# Patient Record
Sex: FEMALE | Race: AMERICAN INDIAN OR ALASKA NATIVE
[De-identification: names, ages, dates, MRNs, and addresses within clinical notes are randomized per-mention and may not be internally consistent; named-entity substitution may affect disease eponyms.]

---

## 2021-01-12 ENCOUNTER — HOSPITAL ENCOUNTER (EMERGENCY)
Dept: HOSPITAL 94 - ER | Age: 51
Discharge: HOME | End: 2021-01-12
Payer: COMMERCIAL

## 2021-01-12 VITALS — HEIGHT: 65 IN | BODY MASS INDEX: 19.03 KG/M2 | WEIGHT: 114.24 LBS

## 2021-01-12 VITALS — SYSTOLIC BLOOD PRESSURE: 152 MMHG | DIASTOLIC BLOOD PRESSURE: 81 MMHG

## 2021-01-12 DIAGNOSIS — R11.2: ICD-10-CM

## 2021-01-12 DIAGNOSIS — Z79.899: ICD-10-CM

## 2021-01-12 DIAGNOSIS — Z88.0: ICD-10-CM

## 2021-01-12 DIAGNOSIS — R10.13: ICD-10-CM

## 2021-01-12 DIAGNOSIS — Z90.49: ICD-10-CM

## 2021-01-12 DIAGNOSIS — F17.200: ICD-10-CM

## 2021-01-12 DIAGNOSIS — N39.0: Primary | ICD-10-CM

## 2021-01-12 LAB
ALBUMIN SERPL BCP-MCNC: 3.9 G/DL (ref 3.4–5)
ALBUMIN/GLOB SERPL: 0.9 {RATIO} (ref 1.1–1.5)
ALP SERPL-CCNC: 99 IU/L (ref 46–116)
ALT SERPL W P-5'-P-CCNC: 34 U/L (ref 12–78)
ANION GAP SERPL CALCULATED.3IONS-SCNC: 14 MMOL/L (ref 8–16)
AST SERPL W P-5'-P-CCNC: 26 U/L (ref 10–37)
BACTERIA URNS QL MICRO: (no result) /HPF
BASOPHILS # BLD AUTO: 0.1 X10'3 (ref 0–0.2)
BASOPHILS NFR BLD AUTO: 0.7 % (ref 0–1)
BILIRUB SERPL-MCNC: 0.8 MG/DL (ref 0.1–1)
BUN SERPL-MCNC: 14 MG/DL (ref 7–18)
BUN/CREAT SERPL: 17.9 (ref 6.6–38)
CALCIUM SERPL-MCNC: 9.4 MG/DL (ref 8.5–10.1)
CHLORIDE SERPL-SCNC: 108 MMOL/L (ref 99–107)
CLARITY UR: CLEAR
CO2 SERPL-SCNC: 26.4 MMOL/L (ref 24–32)
COLOR UR: YELLOW
CREAT SERPL-MCNC: 0.78 MG/DL (ref 0.4–0.9)
DEPRECATED SQUAMOUS URNS QL MICRO: (no result) /LPF
EOSINOPHIL # BLD AUTO: 0.1 X10'3 (ref 0–0.9)
EOSINOPHIL NFR BLD AUTO: 0.8 % (ref 0–6)
ERYTHROCYTE [DISTWIDTH] IN BLOOD BY AUTOMATED COUNT: 15.1 % (ref 11.5–14.5)
GFR SERPL CREATININE-BSD FRML MDRD: 79 ML/MIN
GLUCOSE SERPL-MCNC: 77 MG/DL (ref 70–104)
GLUCOSE UR STRIP-MCNC: NEGATIVE MG/DL
HCG UR QL: NEGATIVE
HCT VFR BLD AUTO: 36.9 % (ref 35–45)
HGB BLD-MCNC: 11.7 G/DL (ref 12–16)
HGB UR QL STRIP: NEGATIVE
KETONES UR STRIP-MCNC: 15 MG/DL
LEUKOCYTE ESTERASE UR QL STRIP: (no result)
LIPASE SERPL-CCNC: 107 U/L (ref 73–393)
LYMPHOCYTES # BLD AUTO: 1.5 X10'3 (ref 1.1–4.8)
LYMPHOCYTES NFR BLD AUTO: 19.1 % (ref 21–51)
MCH RBC QN AUTO: 25.4 PG (ref 27–31)
MCHC RBC AUTO-ENTMCNC: 31.7 G/DL (ref 33–36.5)
MCV RBC AUTO: 79.9 FL (ref 78–98)
MONOCYTES # BLD AUTO: 0.7 X10'3 (ref 0–0.9)
MONOCYTES NFR BLD AUTO: 8.3 % (ref 2–12)
NEUTROPHILS # BLD AUTO: 5.7 X10'3 (ref 1.8–7.7)
NEUTROPHILS NFR BLD AUTO: 71.1 % (ref 42–75)
NITRITE UR QL STRIP: POSITIVE
PH UR STRIP: 5 [PH] (ref 4.8–8)
PLATELET # BLD AUTO: 215 X10'3 (ref 140–440)
PMV BLD AUTO: 8.9 FL (ref 7.4–10.4)
POTASSIUM SERPL-SCNC: 3.7 MMOL/L (ref 3.5–5.1)
PROT SERPL-MCNC: 8.4 G/DL (ref 6.4–8.2)
PROT UR QL STRIP: NEGATIVE MG/DL
RBC # BLD AUTO: 4.62 X10'6 (ref 4.2–5.6)
RBC #/AREA URNS HPF: (no result) /HPF (ref 0–2)
SODIUM SERPL-SCNC: 148 MMOL/L (ref 135–145)
SP GR UR STRIP: 1.02 (ref 1–1.03)
URN COLLECT METHOD CLASS: (no result)
UROBILINOGEN UR STRIP-MCNC: 0.2 E.U/DL (ref 0.2–1)
WBC # BLD AUTO: 8 X10'3 (ref 4.5–11)

## 2021-01-12 PROCEDURE — 87088 URINE BACTERIA CULTURE: CPT

## 2021-01-12 PROCEDURE — 81001 URINALYSIS AUTO W/SCOPE: CPT

## 2021-01-12 PROCEDURE — 80053 COMPREHEN METABOLIC PANEL: CPT

## 2021-01-12 PROCEDURE — 99284 EMERGENCY DEPT VISIT MOD MDM: CPT

## 2021-01-12 PROCEDURE — 87077 CULTURE AEROBIC IDENTIFY: CPT

## 2021-01-12 PROCEDURE — 87186 SC STD MICRODIL/AGAR DIL: CPT

## 2021-01-12 PROCEDURE — 83690 ASSAY OF LIPASE: CPT

## 2021-01-12 PROCEDURE — 85025 COMPLETE CBC W/AUTO DIFF WBC: CPT

## 2021-01-12 PROCEDURE — 96372 THER/PROPH/DIAG INJ SC/IM: CPT

## 2021-01-12 PROCEDURE — 81025 URINE PREGNANCY TEST: CPT

## 2021-01-12 PROCEDURE — 36415 COLL VENOUS BLD VENIPUNCTURE: CPT

## 2021-07-08 ENCOUNTER — HOSPITAL ENCOUNTER (EMERGENCY)
Dept: HOSPITAL 94 - ER | Age: 51
LOS: 1 days | Discharge: HOME | End: 2021-07-09
Payer: COMMERCIAL

## 2021-07-08 VITALS — WEIGHT: 251.33 LBS | HEIGHT: 65 IN | BODY MASS INDEX: 41.87 KG/M2

## 2021-07-08 DIAGNOSIS — R10.84: ICD-10-CM

## 2021-07-08 DIAGNOSIS — Z88.0: ICD-10-CM

## 2021-07-08 DIAGNOSIS — Z98.890: ICD-10-CM

## 2021-07-08 DIAGNOSIS — Z90.49: ICD-10-CM

## 2021-07-08 DIAGNOSIS — Z79.899: ICD-10-CM

## 2021-07-08 DIAGNOSIS — R11.2: Primary | ICD-10-CM

## 2021-07-08 LAB
ALBUMIN SERPL BCP-MCNC: 3.8 G/DL (ref 3.4–5)
ALBUMIN/GLOB SERPL: 0.8 {RATIO} (ref 1.1–1.5)
ALP SERPL-CCNC: 109 IU/L (ref 46–116)
ALT SERPL W P-5'-P-CCNC: 24 U/L (ref 12–78)
ANION GAP SERPL CALCULATED.3IONS-SCNC: 15 MMOL/L (ref 8–16)
AST SERPL W P-5'-P-CCNC: 21 U/L (ref 10–37)
BILIRUB SERPL-MCNC: 0.7 MG/DL (ref 0.1–1)
BUN SERPL-MCNC: 16 MG/DL (ref 7–18)
BUN/CREAT SERPL: 19.5 (ref 6.6–38)
CALCIUM SERPL-MCNC: 9 MG/DL (ref 8.5–10.1)
CHLORIDE SERPL-SCNC: 107 MMOL/L (ref 99–107)
CO2 SERPL-SCNC: 24.5 MMOL/L (ref 24–32)
CREAT SERPL-MCNC: 0.82 MG/DL (ref 0.4–0.9)
GFR SERPL CREATININE-BSD FRML MDRD: 74 ML/MIN
GLUCOSE SERPL-MCNC: 64 MG/DL (ref 70–104)
LIPASE SERPL-CCNC: 121 U/L (ref 73–393)
POTASSIUM SERPL-SCNC: 3.7 MMOL/L (ref 3.5–5.1)
PROT SERPL-MCNC: 8.3 G/DL (ref 6.4–8.2)
SODIUM SERPL-SCNC: 146 MMOL/L (ref 135–145)

## 2021-07-08 PROCEDURE — 85025 COMPLETE CBC W/AUTO DIFF WBC: CPT

## 2021-07-08 PROCEDURE — 83690 ASSAY OF LIPASE: CPT

## 2021-07-08 PROCEDURE — 96375 TX/PRO/DX INJ NEW DRUG ADDON: CPT

## 2021-07-08 PROCEDURE — 80053 COMPREHEN METABOLIC PANEL: CPT

## 2021-07-08 PROCEDURE — 96374 THER/PROPH/DIAG INJ IV PUSH: CPT

## 2021-07-08 PROCEDURE — 99284 EMERGENCY DEPT VISIT MOD MDM: CPT

## 2021-07-08 PROCEDURE — 36415 COLL VENOUS BLD VENIPUNCTURE: CPT

## 2021-07-08 PROCEDURE — 96361 HYDRATE IV INFUSION ADD-ON: CPT

## 2021-07-08 PROCEDURE — 82948 REAGENT STRIP/BLOOD GLUCOSE: CPT

## 2021-07-09 VITALS — SYSTOLIC BLOOD PRESSURE: 120 MMHG | DIASTOLIC BLOOD PRESSURE: 83 MMHG

## 2021-07-09 LAB
BASOPHILS # BLD AUTO: 0.1 X10'3 (ref 0–0.2)
BASOPHILS NFR BLD AUTO: 1.8 % (ref 0–1)
EOSINOPHIL # BLD AUTO: 0 X10'3 (ref 0–0.9)
EOSINOPHIL NFR BLD AUTO: 1.1 % (ref 0–6)
ERYTHROCYTE [DISTWIDTH] IN BLOOD BY AUTOMATED COUNT: 18.8 % (ref 11.5–14.5)
HCT VFR BLD AUTO: 38.8 % (ref 35–45)
HGB BLD-MCNC: 12.4 G/DL (ref 12–16)
LYMPHOCYTES # BLD AUTO: 1.3 X10'3 (ref 1.1–4.8)
LYMPHOCYTES NFR BLD AUTO: 30.1 % (ref 21–51)
MCH RBC QN AUTO: 22.7 PG (ref 27–31)
MCHC RBC AUTO-ENTMCNC: 32.1 G/DL (ref 33–36.5)
MCV RBC AUTO: 70.7 FL (ref 78–98)
MONOCYTES # BLD AUTO: 0.2 X10'3 (ref 0–0.9)
MONOCYTES NFR BLD AUTO: 5.8 % (ref 2–12)
NEUTROPHILS # BLD AUTO: 2.6 X10'3 (ref 1.8–7.7)
NEUTROPHILS NFR BLD AUTO: 61.2 % (ref 42–75)
PLATELET # BLD AUTO: (no result) X10'3 (ref 140–440)
PMV BLD AUTO: (no result) FL (ref 7.4–10.4)
RBC # BLD AUTO: 5.49 X10'6 (ref 4.2–5.6)
WBC # BLD AUTO: 4.3 X10'3 (ref 4.5–11)

## 2021-07-09 NOTE — NUR
called daughter fran to come  pt as pt is discharge ready. pt said she 
will send her sister to come  pt

## 2025-01-26 ENCOUNTER — HOSPITAL ENCOUNTER (EMERGENCY)
Dept: HOSPITAL 94 - ER | Age: 55
Discharge: HOME | End: 2025-01-26
Payer: COMMERCIAL

## 2025-01-26 VITALS
HEART RATE: 67 BPM | SYSTOLIC BLOOD PRESSURE: 90 MMHG | TEMPERATURE: 97.4 F | OXYGEN SATURATION: 100 % | DIASTOLIC BLOOD PRESSURE: 57 MMHG

## 2025-01-26 VITALS — BODY MASS INDEX: 26.37 KG/M2 | HEIGHT: 65 IN | WEIGHT: 158.29 LBS

## 2025-01-26 VITALS — RESPIRATION RATE: 16 BRPM
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
## 2025-01-26 DIAGNOSIS — Z90.49: ICD-10-CM

## 2025-01-26 DIAGNOSIS — Z88.0: ICD-10-CM

## 2025-01-26 DIAGNOSIS — S52.021A: Primary | ICD-10-CM
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
## 2025-01-26 DIAGNOSIS — Z79.899: ICD-10-CM

## 2025-01-26 PROCEDURE — 99283 EMERGENCY DEPT VISIT LOW MDM: CPT

## 2025-01-26 PROCEDURE — 29105 APPLICATION LONG ARM SPLINT: CPT

## 2025-01-26 PROCEDURE — 73080 X-RAY EXAM OF ELBOW: CPT

## 2025-01-26 RX ADMIN — HYDROCODONE BITARTRATE AND ACETAMINOPHEN ONE TAB: 10; 325 TABLET ORAL at 17:44
